# Patient Record
(demographics unavailable — no encounter records)

---

## 2024-10-23 NOTE — REASON FOR VISIT
[Initial Consultation] : an initial consultation for [Mother] : mother [FreeTextEntry3] : HFU Kawasaki Disease

## 2024-10-23 NOTE — CONSULT LETTER
[Today's Date] : [unfilled] [Dear  ___:] : Dear Dr. [unfilled]: [Consult - Single Provider] : Thank you very much for allowing me to participate in the care of this patient. If you have any questions, please do not hesitate to contact me. [Sincerely,] : Sincerely, [FreeTextEntry4] : [facility] [FreeTextEntry5] : [address]  [FreeTextEntry6] : [facility] [id number] [de-identified] : Thank you for allowing me to participate in the care of this patient.  Please see my note for my assessment and plan and feel free to contact me if there are any questions or concerns. [de-identified] : Ar Julian MD, MS, [facility] Congenital Interventional Cardiologist Director of Pediatric Cardiac Catheterization [facility]  of Pediatrics, [facility] [employer] at [facility] [place] [address] Tel: [phone] Fax: [phone]   [email or web address]

## 2024-10-23 NOTE — HISTORY OF PRESENT ILLNESS
[FreeTextEntry1] : I had the pleasure of seeing KADI ABDI in the pediatric cardiology clinic of [facility] on Oct 23, 2024.  She was accompanied by her mother.  As you know, KADI is a 2-year-old F who presents for initial outpatient consultation related to recent diagnosis of Kawasaki's disease.  Her clinical course is summarized below.  Since discharge she has not had chest pain, palpitations, cyanosis, syncope, SOB, fever, rash, conjunctivitis or irritability.  Admission history:  KADI ABDI is a 2y10m old female with a past medical history of intermittent heart murmur, but otherwise unremarkable.  Mother is at bedside and reports that on 10/5/24 patient started with a fever (Tmax 102F) that would improve with Motrin/Tylenol but would return ~4h after.  She also noticed skin rash, swollen hands and feet, red eyes ("blood shot") with no discharge, and decreased energy. Mother denies significant cough, congestion, diarrhea, vomiting, or pain. On the following day she presented to an urgent care where strep test was negative and they were sent to the ED for further evaluation. Of note, Kadi had covid infection about 2 weeks ago. She had one day of fever and mild cough which quickly resolved. Currently no one else at home is sick, though Kadi does attend pre-K.  ED Course: Patient uncomfortable and ill appearing, given 2 NS bolus. Initial UA concerning for UTI and patient given CTX x1, but repeat UA via cath wnl. Labwork significant for elevated inflammatory markers: ESR 43, CRP 69.9, AST. 129, . CBC wnl, RVP negative, EBV negative, CXR clear. Bcx, Ucx pending. Hospital course: patient received IVIG on 10/7/24.

## 2024-10-23 NOTE — CARDIOLOGY SUMMARY
[Today's Date] : [unfilled] [FreeTextEntry1] : Sinus rhythm at a rate of  beats per minute with a normal ME and QRS interval. There is no evidence of atrial enlargement, ventricular hypertrophy or pre-excitation.  The QRS axis is normal.  The QTc is within normal limits with no ST or T-wave changes. [FreeTextEntry2] : See report for details.  Structurally normal heart without coronary artery changes.

## 2024-10-23 NOTE — DISCUSSION/SUMMARY
[FreeTextEntry1] : PROBLEM LIST: 1. Kawasaki disease without CA changes.   In summary, KADI is a 2-year-old female with recent Kawasaki disease without coronary involvement, s/p treatment with 1 dose of IVIG.  Today, the echo demonstrates normal coronary arteries and normal ventricular function without pericardial effusion or valvar regurgitation.  I discussed at length with the family the coronary vasculitis that is associated with this disease, the purpose of IVIG and Aspirin therapy, and the need to closely monitor for the development of coronary aneurysms, ischemia, or infarction.  The patient falls into risk stratification level 1 (no coronary changes at any stage of illness), and thus requires no further aspirin therapy or activity restriction.  I will plan to see her back in 6 weeks with an ecg and echocardiogram and repeat inflammatory markers.   In the interim, if there are any further questions, concerns or changes in her clinical status we would be happy to see her at that time.  The patient and family were instructed to return if KADI develops chest pain, palpitations, cyanosis, respiratory distress, exercise intolerance, syncope, recurrent symptoms of KD or any other concerning symptoms.  She does not require SBE prophylaxis or activity limitations.  The family expressed understanding of and agreement with the plan.  All questions were answered.   Thank you for allowing us to participate in the care of this patient.  Feel free to reach out to us with any further questions or concerns. [Needs SBE Prophylaxis] : [unfilled] does not need bacterial endocarditis prophylaxis [May participate in all age-appropriate activities] : [unfilled] May participate in all age-appropriate activities.

## 2024-12-04 NOTE — DISCUSSION/SUMMARY
[May participate in all age-appropriate activities] : [unfilled] May participate in all age-appropriate activities. [FreeTextEntry1] : PROBLEM LIST: 1. Kawasaki disease without CA changes.   In summary, KADI is a 3-year-old female with recent Kawasaki disease without coronary involvement, s/p treatment with 1 dose of IVIG.  Today, the echo demonstrates normal coronary arteries and normal ventricular function without pericardial effusion or valvar regurgitation.  I discussed at length with the family the coronary vasculitis that is associated with this disease, the purpose of IVIG and Aspirin therapy, and the need to closely monitor for the development of coronary aneurysms, ischemia, or infarction.  The patient falls into risk stratification level 1 (no coronary changes at any stage of illness), and thus requires no further aspirin therapy or activity restriction.  I will plan to see her back in 1 year with an ecg and echocardiogram.   In the interim, if there are any further questions, concerns or changes in her clinical status we would be happy to see her at that time.  The patient and family were instructed to return if KADI develops chest pain, palpitations, cyanosis, respiratory distress, exercise intolerance, syncope, recurrent symptoms of KD or any other concerning symptoms.  She does not require SBE prophylaxis or activity limitations.  The family expressed understanding of and agreement with the plan.  All questions were answered.   Thank you for allowing us to participate in the care of this patient.  Feel free to reach out to us with any further questions or concerns. [Needs SBE Prophylaxis] : [unfilled] does not need bacterial endocarditis prophylaxis

## 2024-12-04 NOTE — CARDIOLOGY SUMMARY
[Today's Date] : [unfilled] [FreeTextEntry1] : Sinus rhythm at a rate of 121 beats per minute with a short PA and normal QRS interval.  There is no evidence of atrial enlargement, ventricular hypertrophy or pre-excitation.  The QRS axis is normal.  The QTc is within normal limits with no ST or T-wave changes. [FreeTextEntry2] : See report for details.  Structurally normal heart without coronary artery changes.

## 2024-12-04 NOTE — HISTORY OF PRESENT ILLNESS
[FreeTextEntry1] : I had the pleasure of seeing KADI ABDI in the pediatric cardiology clinic of [facility] on December 4, 2024.  She was accompanied by her mother.  As you know, KADI is a 3-year-old F who presents for outpatient consultation related to recent diagnosis of Kawasaki's disease.  Her clinical course is summarized below.  She was last seen by me on Oct 23, 2024.  Since that visit she has not had chest pain, palpitations, cyanosis, syncope, SOB, fever, rash, conjunctivitis or irritability.  Admission history:  KADI ABDI is a 2y10m old female with a past medical history of intermittent heart murmur, but otherwise unremarkable.  Mother is at bedside and reports that on 10/5/24 patient started with a fever (Tmax 102F) that would improve with Motrin/Tylenol but would return ~4h after.  She also noticed skin rash, swollen hands and feet, red eyes ("blood shot") with no discharge, and decreased energy. Mother denies significant cough, congestion, diarrhea, vomiting, or pain. On the following day she presented to an urgent care where strep test was negative and they were sent to the ED for further evaluation. Of note, Kadi had covid infection about 2 weeks ago. She had one day of fever and mild cough which quickly resolved. Currently no one else at home is sick, though Kadi does attend pre-K.  ED Course: Patient uncomfortable and ill appearing, given 2 NS bolus. Initial UA concerning for UTI and patient given CTX x1, but repeat UA via cath wnl. Labwork significant for elevated inflammatory markers: ESR 43, CRP 69.9, AST. 129, . CBC wnl, RVP negative, EBV negative, CXR clear. Bcx, Ucx pending. Hospital course: patient received IVIG on 10/7/24.

## 2024-12-04 NOTE — CLEARANCE
[Cleared] : ~He/She~ is cleared for dental procedure.  [Does not require] : ~He/She~ does not require SBE prophylaxis [Does not require] : ~He/She~ does not require anesthesia by nor in consultation with a Pediatric Cardiac Anesthesiologist [Cleared] : psychotropic medication [May participate in all age appropriate activities.] : ~He/She~ may participate in all age appropriate activities

## 2024-12-04 NOTE — CONSULT LETTER
[Today's Date] : [unfilled] [Name] : Name: [unfilled] [] : : ~~ [Today's Date:] : [unfilled] [Dear  ___:] : Dear Dr. [unfilled]: [Consult] : I had the pleasure of evaluating your patient, [unfilled]. My full evaluation follows. [Consult - Single Provider] : Thank you very much for allowing me to participate in the care of this patient. If you have any questions, please do not hesitate to contact me. [Sincerely,] : Sincerely, [FreeTextEntry4] : [facility] [FreeTextEntry5] : [address]  [FreeTextEntry6] : [place] [address] [de-identified] : Thank you for allowing me to participate in the care of this patient.  Please see my note for my assessment and plan and feel free to contact me if there are any questions or concerns. [de-identified] : Ar Julian MD, MS, [facility] Congenital Interventional Cardiologist Director of Pediatric Cardiac Catheterization [facility]  of Pediatrics, [facility] [employer] at [facility] [place] [address] Tel: [phone] Fax: [phone]   [email or web address]